# Patient Record
Sex: MALE | Employment: FULL TIME | URBAN - METROPOLITAN AREA
[De-identification: names, ages, dates, MRNs, and addresses within clinical notes are randomized per-mention and may not be internally consistent; named-entity substitution may affect disease eponyms.]

---

## 2023-04-06 ENCOUNTER — HOSPITAL ENCOUNTER (EMERGENCY)
Facility: MEDICAL CENTER | Age: 18
End: 2023-04-07
Attending: STUDENT IN AN ORGANIZED HEALTH CARE EDUCATION/TRAINING PROGRAM
Payer: COMMERCIAL

## 2023-04-06 DIAGNOSIS — M54.40 ACUTE RIGHT-SIDED LOW BACK PAIN WITH SCIATICA, SCIATICA LATERALITY UNSPECIFIED: ICD-10-CM

## 2023-04-06 PROCEDURE — 700111 HCHG RX REV CODE 636 W/ 250 OVERRIDE (IP): Performed by: STUDENT IN AN ORGANIZED HEALTH CARE EDUCATION/TRAINING PROGRAM

## 2023-04-06 PROCEDURE — 700102 HCHG RX REV CODE 250 W/ 637 OVERRIDE(OP): Performed by: STUDENT IN AN ORGANIZED HEALTH CARE EDUCATION/TRAINING PROGRAM

## 2023-04-06 PROCEDURE — 96372 THER/PROPH/DIAG INJ SC/IM: CPT

## 2023-04-06 PROCEDURE — A9270 NON-COVERED ITEM OR SERVICE: HCPCS | Performed by: STUDENT IN AN ORGANIZED HEALTH CARE EDUCATION/TRAINING PROGRAM

## 2023-04-06 PROCEDURE — 99283 EMERGENCY DEPT VISIT LOW MDM: CPT

## 2023-04-06 RX ORDER — KETOROLAC TROMETHAMINE 30 MG/ML
30 INJECTION, SOLUTION INTRAMUSCULAR; INTRAVENOUS ONCE
Status: DISCONTINUED | OUTPATIENT
Start: 2023-04-06 | End: 2023-04-06

## 2023-04-06 RX ORDER — ACETAMINOPHEN 325 MG/1
650 TABLET ORAL ONCE
Status: COMPLETED | OUTPATIENT
Start: 2023-04-06 | End: 2023-04-06

## 2023-04-06 RX ORDER — KETOROLAC TROMETHAMINE 30 MG/ML
30 INJECTION, SOLUTION INTRAMUSCULAR; INTRAVENOUS ONCE
Status: COMPLETED | OUTPATIENT
Start: 2023-04-06 | End: 2023-04-06

## 2023-04-06 RX ORDER — OXYCODONE HYDROCHLORIDE AND ACETAMINOPHEN 5; 325 MG/1; MG/1
1 TABLET ORAL ONCE
Status: COMPLETED | OUTPATIENT
Start: 2023-04-06 | End: 2023-04-06

## 2023-04-06 RX ADMIN — ACETAMINOPHEN 650 MG: 325 TABLET ORAL at 23:38

## 2023-04-06 RX ADMIN — KETOROLAC TROMETHAMINE 30 MG: 30 INJECTION, SOLUTION INTRAMUSCULAR at 23:38

## 2023-04-06 RX ADMIN — OXYCODONE AND ACETAMINOPHEN 1 TABLET: 5; 325 TABLET ORAL at 23:38

## 2023-04-06 ASSESSMENT — PAIN DESCRIPTION - PAIN TYPE
TYPE: ACUTE PAIN
TYPE: ACUTE PAIN

## 2023-04-06 NOTE — LETTER
"  FORM C-4:  EMPLOYEE’S CLAIM FOR COMPENSATION/ REPORT OF INITIAL TREATMENT  EMPLOYEE’S CLAIM - PROVIDE ALL INFORMATION REQUESTED   First Name Diallo Last Name Dax Birthdate 2005  Sex male Claim Number   Home Address [address]             Zip [address]                                   Age  17 y.o. 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 (if applicable) walking - getting up    How did this injury or occupational disease occur? Be specific and answer in detail. 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  After the day went on it started hurting again and more painfully till  I couldnt walk   If you believe that you have an occupational disease, when did you first have knowledge of the disability and it relationship to your employment? NA Witnesses to the Accident  Devonte Mcnulty Mickey Larson   Nature of Injury or Occupational Disease  Workers' Compensation Part(s) of Body Injured or Affected  Lower Back Area (Lumbar Area & Lumbo-Sacral), Upper " Leg (R), N/A    I CERTIFY THAT THE ABOVE IS TRUE AND CORRECT TO THE BEST OF MY KNOWLEDGE AND THAT I HAVE PROVIDED THIS INFORMATION IN ORDER TO OBTAIN THE BENEFITS OF [place] INCLUS[place] OR CHAPTER 617 OF [place]).  I HEREBY AUTHORIZE ANY PHYSICIAN, CHIROPRACTOR, SURGEON, PRACTITIONER, OR OTHER PERSON, ANY HOSPITAL, INCLUDING [facility] OR [facility], ANY MEDICAL SERVICE ORGANIZATION, ANY INSURANCE COMPANY, OR OTHER INSTITUTION OR ORGANIZATION TO RELEASE TO EACH OTHER, ANY MEDICAL OR OTHER INFORMATION, INCLUDING BENEFITS PAID OR PAYABLE, PERTINENT TO THIS INJURY OR DISEASE, EXCEPT INFORMATION RELATIVE TO DIAGNOSIS, TREATMENT AND/OR COUNSELING FOR AIDS, PSYCHOLOGICAL CONDITIONS, ALCOHOL OR CONTROLLED SUBSTANCES, FOR WHICH I MUST GIVE SPECIFIC AUTHORIZATION.  A PHOTOSTAT OF THIS AUTHORIZATION SHALL BE AS VALID AS THE ORIGINAL.  Date: 04/06/2023                       Place: [facility]                               Employee’s Signature:   THIS REPORT MUST BE COMPLETED AND MAILED WITHIN 3 WORKING DAYS OF TREATMENT   Place [facility], EMERGENCY DEPT                       Name of Facility [facility]   Date  4/6/2023 Diagnosis  (M54.40) Acute right-sided low back pain with sciatica, sciatica laterality unspecified Is there evidence the injured employee was under the influence of alcohol and/or another controlled substance at the time of accident?   Hour  12:06 AM Description of Injury or Disease  Acute right-sided low back pain with sciatica, sciatica laterality unspecified No   Treatment  Pain medications  Have you advised the patient to remain off work five days or more?         No   X-Ray Findings    If Yes   From Date    To Date      From information given by the employee, together with medical evidence, can you directly connect this injury or occupational disease as job incurred? Yes If No, is  "employee capable of: Full Duty  No Modified Duty      Is additional medical care by a physician indicated? Yes If Modified Duty, Specify any Limitations / Restrictions   No heavy lifting over 10 pounds for 3 days   Do you know of any previous injury or disease contributing to this condition or occupational disease? No    Date 4/7/2023 Print Doctor’s Name Dick Lopez certify the employer’s copy of this form was mailed on:   Address [address] [place] [address] [id number] [phone] INSURER’S USE ONLY   Provider’s Tax ID Number [id number] Telephone Dept: [phone]    Doctor’s Signature e-DICK Mari D.O. Degree M.D.       Form C-4 (rev.10/07)                                                                         BRIEF DESCRIPTION OF RIGHTS AND BENEFITS  (Pursuant to [place] 616C.050)    Notice of Injury or Occupational Disease (Incident Report Form C-1): If an injury or occupational disease (OD) arises out of and in the course of employment, you must provide written notice to your employer as soon as practicable, but no later than 7 days after the accident or OD. Your employer shall maintain a sufficient supply of the required forms.    Claim for Compensation (Form C-4): If medical treatment is sought, the form C-4 is available at the place of initial treatment. A completed \"Claim for Compensation\" (Form C-4) must be filed within 90 days after an accident or OD. The treating physician or chiropractor must, within 3 working days after treatment, complete and mail to the employer, the employer's insurer and third-party , the Claim for Compensation.    Medical Treatment: If you require medical treatment for your on-the-job injury or OD, you may be required to select a physician or chiropractor from a list provided by your workers’ compensation insurer, if it has contracted with an Organization for Managed Care (MCO) or Preferred Provider Organization (PPO) or providers of health care. If " your employer has not entered into a contract with an MCO or PPO, you may select a physician or chiropractor from the Panel of Physicians and Chiropractors. Any medical costs related to your industrial injury or OD will be paid by your insurer.    Temporary Total Disability (TTD): If your doctor has certified that you are unable to work for a period of at least 5 consecutive days, or 5 cumulative days in a 20-day period, or places restrictions on you that your employer does not accommodate, you may be entitled to TTD compensation.    Temporary Partial Disability (TPD): If the wage you receive upon reemployment is less than the compensation for TTD to which you are entitled, the insurer may be required to pay you TPD compensation to make up the difference. TPD can only be paid for a maximum of 24 months.    Permanent Partial Disability (PPD): When your medical condition is stable and there is an indication of a PPD as a result of your injury or OD, within 30 days, your insurer must arrange for an evaluation by a rating physician or chiropractor to determine the degree of your PPD. The amount of your PPD award depends on the date of injury, the results of the PPD evaluation, your age and wage.    Permanent Total Disability (PTD): If you are medically certified by a treating physician or chiropractor as permanently and totally disabled and have been granted a PTD status by your insurer, you are entitled to receive monthly benefits not to exceed 66 2/3% of your average monthly wage. The amount of your PTD payments is subject to reduction if you previously received a lump-sum PPD award.    Vocational Rehabilitation Services: You may be eligible for vocational rehabilitation services if you are unable to return to the job due to a permanent physical impairment or permanent restrictions as a result of your injury or occupational disease.    Transportation and Per Lorena Reimbursement: You may be eligible for travel expenses  and per laurie associated with medical treatment.    Reopening: You may be able to reopen your claim if your condition worsens after claim closure.     Appeal Process: If you disagree with a written determination issued by the insurer or the insurer does not respond to your request, you may appeal to the Department of Administration, , by following the instructions contained in your determination letter. You must appeal the determination within 70 days from the date of the determination letter at [address], [address], [place], [place] [address], or [address], [place], [place] [address]. If you disagree with the  decision, you may appeal to the Department of Administration, . You must file your appeal within 30 days from the date of the  decision letter at [address], [address], [place], [place] [address], or [address] [facility] [address], [place], [place] [address]. If you disagree with a decision of an , you may file a petition for judicial review with the District Court. You must do so within 30 days of the Appeal Officer’s decision. You may be represented by an  at your own expense or you may contact the [facility] for possible representation.    [place]  for Injured Workers (NAIW): If you disagree with a  decision, you may request that NAIW represent you without charge at an  Hearing. For information regarding denial of benefits, you may contact the [facility] at: [address], [address], [place] [address], [phone], or [address] [facility] [address] [address], [phone]    To File a Complaint with the Division: If you wish to file a complaint with the  of the Division of Industrial Relations (DIR),  please contact the Workers’ Compensation Section, [address], [address], [place], [place] [address], telephone  [phone], or [address], [address], [place], [place] [address], telephone [phone].    For assistance with Workers’ Compensation Issues: You may contact the [facility] Office for Consumer Health Assistance, [address], [address], [place], [place] [address], Toll Free 1-197.323.2263, Web site: http://[facility].[place].gov/Programs/EUGENIE E-mail: [email or web address].[place].gov  D-2 (rev. 10/20)              __________________________________________________________________                                    _04.07.2023________________            Employee Name / Signature                                                                                                                            Date

## 2023-04-07 VITALS
OXYGEN SATURATION: 96 % | HEIGHT: 73 IN | BODY MASS INDEX: 21.87 KG/M2 | RESPIRATION RATE: 16 BRPM | HEART RATE: 98 BPM | DIASTOLIC BLOOD PRESSURE: 67 MMHG | WEIGHT: 165 LBS | TEMPERATURE: 99 F | SYSTOLIC BLOOD PRESSURE: 112 MMHG

## 2023-04-07 RX ORDER — CYCLOBENZAPRINE HCL 10 MG
10 TABLET ORAL 3 TIMES DAILY PRN
Qty: 30 TABLET | Refills: 0 | Status: SHIPPED | OUTPATIENT
Start: 2023-04-07

## 2023-04-07 RX ORDER — ACETAMINOPHEN 500 MG
500-1000 TABLET ORAL EVERY 6 HOURS PRN
Qty: 30 TABLET | Refills: 0 | Status: SHIPPED | OUTPATIENT
Start: 2023-04-07

## 2023-04-07 RX ORDER — LIDOCAINE 4 G/G
1 PATCH TOPICAL EVERY 12 HOURS
Qty: 1 PATCH | Refills: 0 | Status: SHIPPED | OUTPATIENT
Start: 2023-04-07

## 2023-04-07 RX ORDER — IBUPROFEN 600 MG/1
600 TABLET ORAL EVERY 6 HOURS PRN
Qty: 30 TABLET | Refills: 0 | Status: SHIPPED | OUTPATIENT
Start: 2023-04-07

## 2023-04-07 NOTE — ED PROVIDER NOTES
"ED Provider Note    CHIEF COMPLAINT  Chief Complaint   Patient presents with    Back Pain     Four hours ago, pt abruptly stood and felt a shooting pain in his lower back. Pain has gotten progressively worse and pain is now spreading right leg.        EXTERNAL RECORDS REVIEWED  None    HPI/ROS  LIMITATION TO HISTORY   Select: : None  OUTSIDE HISTORIAN(S):  None    Diallo Verduzco is a 17 y.o. male who presents evaluation of right lower back pain.  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  He denies any urinary retention loss of bowel or bladder control saddle anesthesias no numbness or weakness of his right leg does state it is difficult to ambulate secondary to the pain.  No trauma or falls.    PAST MEDICAL HISTORY       SURGICAL HISTORY  patient denies any surgical history    FAMILY HISTORY  No family history on file.    SOCIAL HISTORY  Social History     Tobacco Use    Smoking status: Not on file    Smokeless tobacco: Not on file   Substance and Sexual Activity    Alcohol use: Not on file    Drug use: Not on file    Sexual activity: Not on file       CURRENT MEDICATIONS  Home Medications       Reviewed by Mirian Galeas R.N. (Registered Nurse) on 04/06/23 at 2251  Med List Status: Partial     Medication Last Dose Status        Patient Giuliano Taking any Medications                           ALLERGIES  Allergies   Allergen Reactions    [place] (Diagnostic)      Facial swelling         PHYSICAL EXAM  VITAL SIGNS: /67   Pulse 98   Temp 37.2 °C (99 °F) (Temporal)   Resp 16   Ht 1.854 m (6' 1\")   Wt 74.8 kg (165 lb)   SpO2 96%   BMI 21.77 kg/m²      Pulse ox interpretation: I interpret this pulse ox as normal.  VITALS - vital signs documented prior to this note have been reviewed and noted,  see EHR  GENERAL - awake and alert, " no acute distress  HEENT - normocephalic, atraumatic, moist mucus membranes  CARDIOVASCULAR - regular rate and rhythm  PULMONARY - unlabored, no respiratory distress. No audible wheezing or  stridor.  Back: Patellar DTRs are 2+ bilaterally sensation in lower extremities is intact bilaterally lower extremity strength is 5/5 and no overlying rashes contusions abrasions on inspection of the back, no bony tenderness  Is tenderness with palpation of his right to lumbar paraspinal musculature  Dorsiflexion plantarflexion of his right lower extremities 5 out of 5 extensor hallux longus strength is 5-5 sensation is grossly intact to light DTRs are 2+  GASTROINTESTINAL - non-tender, non-distended  NEUROLOGIC - mental status normal, speech fluid, cognition normal  MUSCULOSKELETAL -no obvious deformity or swelling  DERMATOLOGIC - warm and dry, no visible rashes  PSYCHIATRIC - normal affect, normal concentration    DIAGNOSTIC STUDIES / PROCEDURES      COURSE & MEDICAL DECISION MAKING    ED Observation Status? No; Patient does not meet criteria for ED Observation.     INITIAL ASSESSMENT, COURSE AND PLAN  Care Narrative: Patient presented for evaluation of acute onset back pain.  Differential include was not limited to Musculoskeletal strain spasm radiculopathy among many other considerations.  He has no urinary tension loss of bowel or bladder control saddle anesthesias no ripping tearing back pain, no known history of connective tissue disorders no trauma no falls  or other back pain red flags lowering clinical concern for underlying aortic abdominal aneurysm aortic dissection cauda equina space-occupying lesion, epidural abscess epidural bleed or fracture.  Patient does have palpable spasm of his paraspinal musculature on the right, he was treated with oxycodone Toradol and Tylenol in the ER.  Will discharge home with muscle relaxers, scheduled NSAIDs Tylenol and lidocaine patches instructed to follow-up with his primary care  physician, return precautions were discussed at length and he was discharged in stable condition          ADDITIONAL PROBLEM LIST  none  DISPOSITION AND DISCUSSIONS  I have discussed management of the patient with the following physicians and VIJAY's:  none    Discussion of management with other QHP or appropriate source(s):  none      Escalation of care considered, and ultimately not performed:diagnostic imaging    Barriers to care at this time, including but not limited to:  none .     Decision tools and prescription drugs considered including, but not limited to: Muscle relaxers.    FINAL DIAGNOSIS  1. Acute right-sided low back pain with sciatica, sciatica laterality unspecified           Electronically signed by: Redd Lopez D.O., 4/6/2023 11:28 PM

## 2023-04-07 NOTE — ED NOTES
Pt educated on discharge instructions. All questions & concerns addressed. Vitals re-assessed and at pt's baseline. Pt ambulates to exit with steady gait and all belongings.    No IV to d/c

## 2023-04-07 NOTE — ED TRIAGE NOTES
"Chief Complaint   Patient presents with    Back Pain     Four hours ago, pt abruptly stood and felt a shooting pain in his lower back. Pain has gotten progressively worse and pain is now spreading right leg.      Pt to triage with above complaints. Pt denies any medical history and denies any numbness/tingling. Pt is able to walk/ bear weight but states it is painful.     Pt educated on triage process, placed back in lobby, and instructed to inform staff of any changes.     /77   Pulse 100   Temp 37.5 °C (99.5 °F) (Temporal)   Resp 16   Ht 1.854 m (6' 1\")   Wt 74.8 kg (165 lb)   SpO2 97%   BMI 21.77 kg/m²     "

## 2024-12-31 ENCOUNTER — PHARMACY VISIT (OUTPATIENT)
Dept: PHARMACY | Facility: MEDICAL CENTER | Age: 19
End: 2024-12-31
Payer: MEDICARE

## 2024-12-31 ENCOUNTER — HOSPITAL ENCOUNTER (EMERGENCY)
Facility: MEDICAL CENTER | Age: 19
End: 2024-12-31
Attending: EMERGENCY MEDICINE
Payer: COMMERCIAL

## 2024-12-31 VITALS
BODY MASS INDEX: 25.45 KG/M2 | OXYGEN SATURATION: 96 % | DIASTOLIC BLOOD PRESSURE: 85 MMHG | RESPIRATION RATE: 16 BRPM | HEART RATE: 80 BPM | SYSTOLIC BLOOD PRESSURE: 145 MMHG | HEIGHT: 73 IN | WEIGHT: 192 LBS | TEMPERATURE: 97.3 F

## 2024-12-31 DIAGNOSIS — M62.830 SPASM OF MUSCLE OF LOWER BACK: ICD-10-CM

## 2024-12-31 DIAGNOSIS — M54.40 ACUTE RIGHT-SIDED LOW BACK PAIN WITH SCIATICA, SCIATICA LATERALITY UNSPECIFIED: ICD-10-CM

## 2024-12-31 PROCEDURE — 96372 THER/PROPH/DIAG INJ SC/IM: CPT

## 2024-12-31 PROCEDURE — 700111 HCHG RX REV CODE 636 W/ 250 OVERRIDE (IP): Mod: JZ | Performed by: EMERGENCY MEDICINE

## 2024-12-31 PROCEDURE — 99283 EMERGENCY DEPT VISIT LOW MDM: CPT

## 2024-12-31 PROCEDURE — RXOTC WILLOW AMBULATORY OTC CHARGE

## 2024-12-31 PROCEDURE — RXMED WILLOW AMBULATORY MEDICATION CHARGE: Performed by: EMERGENCY MEDICINE

## 2024-12-31 PROCEDURE — 700102 HCHG RX REV CODE 250 W/ 637 OVERRIDE(OP): Performed by: EMERGENCY MEDICINE

## 2024-12-31 RX ORDER — KETOROLAC TROMETHAMINE 15 MG/ML
15 INJECTION, SOLUTION INTRAMUSCULAR; INTRAVENOUS ONCE
Status: COMPLETED | OUTPATIENT
Start: 2024-12-31 | End: 2024-12-31

## 2024-12-31 RX ORDER — DEXAMETHASONE 4 MG/1
4 TABLET ORAL ONCE
Status: COMPLETED | OUTPATIENT
Start: 2024-12-31 | End: 2024-12-31

## 2024-12-31 RX ORDER — CYCLOBENZAPRINE HCL 10 MG
10 TABLET ORAL 3 TIMES DAILY PRN
Qty: 30 TABLET | Refills: 0 | Status: SHIPPED | OUTPATIENT
Start: 2024-12-31

## 2024-12-31 RX ORDER — LIDOCAINE 4 G/G
1 PATCH TOPICAL EVERY 12 HOURS
Qty: 1 PATCH | Refills: 0 | Status: SHIPPED | OUTPATIENT
Start: 2024-12-31

## 2024-12-31 RX ORDER — IBUPROFEN 600 MG/1
600 TABLET, FILM COATED ORAL EVERY 6 HOURS PRN
Qty: 30 TABLET | Refills: 0 | Status: SHIPPED | OUTPATIENT
Start: 2024-12-31

## 2024-12-31 RX ADMIN — KETOROLAC TROMETHAMINE 15 MG: 15 INJECTION, SOLUTION INTRAMUSCULAR; INTRAVENOUS at 13:22

## 2024-12-31 RX ADMIN — DEXAMETHASONE 4 MG: 4 TABLET ORAL at 13:21

## 2024-12-31 ASSESSMENT — PAIN DESCRIPTION - PAIN TYPE: TYPE: ACUTE PAIN

## 2024-12-31 NOTE — ED NOTES
Patient discharged home per ERP.  Discharge teaching and education discussed with patient. POC discussed.   Patient verbalized understanding of discharge teaching and education. No other questions at this time.     RX for motrin, flexeril & lidocaine patch sent to pt's pharmacy.     VSS. Patient alert and oriented. Patient arranged ride for self. Able to ambulate off unit safely with steady gait.

## 2024-12-31 NOTE — ED PROVIDER NOTES
"ED Provider Note    Primary care provider: Pcp Pt States None    CHIEF COMPLAINT  Chief Complaint   Patient presents with    Back Pain     Pt woke up this am with back pain, pt states he got up to walk and collapsed due to the pain       HPI  Diallo Verduzco is a 19 y.o[place] who presents to the Emergency Department for back pain that began yesterday morning. He reports that it was his lower left back that had sharp pain with associated pins and needles down the posterior left leg to his calf. He reports pain is worse in the morning and feels better with movement. He reports that he had a work-related injury 1.5 years ago and has had intermittent symptoms since then with another exacerbation occurring in July. He reports that he had an MRI with no significant findings for disc herniation. He denies saddle anesthesia, urinary or bowel incontinence, fever, night pain.       External record review: Only 1 visit in our system which was a similar ER visit for back pain in April of last year.       PAST MEDICAL HISTORY       SURGICAL HISTORY  patient denies any surgical history    PHYSICAL EXAM  VITAL SIGNS: BP (!) 157/93   Pulse 86   Temp 36.6 °C (97.8 °F) (Temporal)   Resp 16   Ht 1.854 m (6' 1\")   Wt 87.1 kg (192 lb)   SpO2 98%   BMI 25.33 kg/m²   Constitutional: Awake, alert in no apparent distress.  HENT: Normocephalic, Bilateral external ears normal. Nose normal.   Eyes: Conjunctiva normal, non-icteric, EOMI.    Thorax & Lungs: Easy unlabored respirations  Cardiovascular:    Abdomen:  No distention, no pulsatile masses  Skin: Visualized skin is  Dry, No erythema, No rash.   Musculoskeletal: No midline tenderness or step-off, there is some spasming left side, negative SLR bilaterally, DTRs 1+ at the patella, good strength bilateral lower extremities without weakness or sensory deficit.    Neurologic: Alert, Grossly non-focal.   Psychiatric: Affect and Mood normal      12:32 " PM - Nursing notes reviewed, patient seen and examined at bedside.    Escalation of care considered, and ultimately not performed: diagnostic imaging.    Barriers to care at this time, including but not limited to: Patient does not have established PCP.     Decision Making:  This is a pleasant 19 y.o. year old male who presents with apparent musculoskeletal back pain. There is no significant antecedent trauma. Imaging is not indicated at this time. I do not suspect acute spinal fracture. There is no evidence of cauda equina. The patient is neurologically intact. The abdomen is soft without abdominal masses. I do not suspect AAA. There are no urinary symptoms or CVA tenderness. I do not suspect renal colic or pyelonephritis.    I counseled the patient extensively on the typical course for back pain. I recommend anti-inflammatories and physical therapy.    He has already had advanced imaging which is reassuring.  His job is certainly causing him to have more flareups of back pain.  Recommend continued follow-up with occupational therapy.         The patient was discharged (see d/c instructions) was told to return immediately for any signs or symptoms listed, or any worsening at all.  The patient verbally agreed to the discharge precautions and follow-up plan which is documented in EPIC.    Discharge Medications:  Current Discharge Medication List          FINAL IMPRESSION  1. Spasm of muscle of lower back    2. Acute right-sided low back pain with sciatica, sciatica laterality unspecified

## 2024-12-31 NOTE — ED TRIAGE NOTES
"Chief Complaint   Patient presents with    Back Pain     Pt woke up this am with back pain, pt states he got up to walk and collapsed due to the pain       Pt ambulatory into triage for above. Pt states it is severe sciatica on his L side. Pt states he has been dealing with this pain on and off since July. Pt aox4 gcs 15        BP (!) 157/93   Pulse 86   Temp 36.6 °C (97.8 °F) (Temporal)   Resp 16   Ht 1.854 m (6' 1\")   Wt 87.1 kg (192 lb)   SpO2 98%   BMI 25.33 kg/m²     "

## 2024-12-31 NOTE — LETTER
PHYSICIAN’S AND CHIROPRACTIC PHYSICIAN'S   PROGRESS REPORT   CERTIFICATION OF DISABILITY Claim Number: [id number]    Social Security Number:    Patient’s Name: Diallo Verduzco Date of Injury: 7/28/24   Employer: [employer] Name of MCO (if applicable):      Patient’s Job Description/Occupation:        Previous Injuries/Diseases/Surgeries Contributing to the Condition:  Prior back strain from work.  This was also a Workmen's Compensation claim last year.      Diagnosis: (M62.830) Spasm of muscle of lower back  (M54.40) Acute right-sided low back pain with sciatica, sciatica laterality unspecified      Related to the Industrial Injury? Yes     Explain:        Objective Medical Findings: Good strength, sensation.         None - Discharged                         Stable  No                 Ratable  No        Generally Improved                         Condition Worsened               X   Condition Same  May Have Suffered a Permanent Disability No     Treatment Plan:    Conservative management, referral to occupational therapy, PT.         No Change in Therapy                  PT/OT Prescribed                      Medication May be Used While Working        Case Management                          PT/OT Discontinued    Consultation    Further Diagnostic Studies:    Prescription(s)                 Released to FULL DUTY /No Restrictions on (Date):  12/31/2024    Certified TOTALLY TEMPORARILY DISABLED (Indicate Dates) From:   To:    X  Released to RESTRICTED/Modified Duty on (Date): From: 12/31/2024 To: 1/8/2025  Restrictions Are:         No Sitting    No Standing    No Pulling Other:         No Bending at Waist     No Stooping     No Lifting        No Carrying     No Walking Lifting Restricted to (lbs.):  < or = to 25 pounds       No Pushing        No Climbing     No Reaching Above Shoulders       Date of Next Visit:    Date of this Exam: 12/31/2024 Physician/Chiropractic Physician Name: Orlando Murry  SALOMON Physician/Chiropractic Physician Signature:  SANAM Keller M.D.   [place]:  [address] [place] [address], [address] [place], [place] [address] - Telephone [phone] [place]:  [address] [place] [address], [address] [place], [place] [address] - Telephone [phone]    [email or web address]  D-39 (Rev. 10/24)